# Patient Record
Sex: MALE | Race: WHITE | NOT HISPANIC OR LATINO | URBAN - METROPOLITAN AREA
[De-identification: names, ages, dates, MRNs, and addresses within clinical notes are randomized per-mention and may not be internally consistent; named-entity substitution may affect disease eponyms.]

---

## 2023-04-26 ENCOUNTER — OFFICE VISIT (OUTPATIENT)
Dept: CARDIOLOGY | Facility: CLINIC | Age: 43
End: 2023-04-26
Payer: COMMERCIAL

## 2023-04-26 VITALS
DIASTOLIC BLOOD PRESSURE: 78 MMHG | SYSTOLIC BLOOD PRESSURE: 120 MMHG | WEIGHT: 200.38 LBS | BODY MASS INDEX: 30.37 KG/M2 | HEIGHT: 68 IN | HEART RATE: 74 BPM

## 2023-04-26 DIAGNOSIS — M1A.9XX0 CHRONIC GOUT INVOLVING TOE OF RIGHT FOOT WITHOUT TOPHUS, UNSPECIFIED CAUSE: Chronic | ICD-10-CM

## 2023-04-26 DIAGNOSIS — R06.02 SHORTNESS OF BREATH: Primary | ICD-10-CM

## 2023-04-26 DIAGNOSIS — Z72.0 TOBACCO DIPPER: Chronic | ICD-10-CM

## 2023-04-26 DIAGNOSIS — I10 PRIMARY HYPERTENSION: Chronic | ICD-10-CM

## 2023-04-26 DIAGNOSIS — E78.00 HYPERCHOLESTEROLEMIA: Chronic | ICD-10-CM

## 2023-04-26 DIAGNOSIS — Z82.49 FAMILY HISTORY OF EARLY CAD: ICD-10-CM

## 2023-04-26 DIAGNOSIS — G47.39 OTHER SLEEP APNEA: Chronic | ICD-10-CM

## 2023-04-26 DIAGNOSIS — E66.9 OBESITY, CLASS I, BMI 30-34.9: Chronic | ICD-10-CM

## 2023-04-26 PROBLEM — E66.811 OBESITY, CLASS I, BMI 30-34.9: Status: ACTIVE | Noted: 2023-04-26

## 2023-04-26 PROCEDURE — 3078F PR MOST RECENT DIASTOLIC BLOOD PRESSURE < 80 MM HG: ICD-10-PCS | Mod: CPTII,S$GLB,, | Performed by: INTERNAL MEDICINE

## 2023-04-26 PROCEDURE — 99204 PR OFFICE/OUTPT VISIT, NEW, LEVL IV, 45-59 MIN: ICD-10-PCS | Mod: 25,S$GLB,, | Performed by: INTERNAL MEDICINE

## 2023-04-26 PROCEDURE — 93000 EKG 12-LEAD: ICD-10-PCS | Mod: S$GLB,,, | Performed by: INTERNAL MEDICINE

## 2023-04-26 PROCEDURE — 99204 OFFICE O/P NEW MOD 45 MIN: CPT | Mod: 25,S$GLB,, | Performed by: INTERNAL MEDICINE

## 2023-04-26 PROCEDURE — 1159F MED LIST DOCD IN RCRD: CPT | Mod: CPTII,S$GLB,, | Performed by: INTERNAL MEDICINE

## 2023-04-26 PROCEDURE — 4010F PR ACE/ARB THEARPY RXD/TAKEN: ICD-10-PCS | Mod: CPTII,S$GLB,, | Performed by: INTERNAL MEDICINE

## 2023-04-26 PROCEDURE — 93000 ELECTROCARDIOGRAM COMPLETE: CPT | Mod: S$GLB,,, | Performed by: INTERNAL MEDICINE

## 2023-04-26 PROCEDURE — 4010F ACE/ARB THERAPY RXD/TAKEN: CPT | Mod: CPTII,S$GLB,, | Performed by: INTERNAL MEDICINE

## 2023-04-26 PROCEDURE — 1159F PR MEDICATION LIST DOCUMENTED IN MEDICAL RECORD: ICD-10-PCS | Mod: CPTII,S$GLB,, | Performed by: INTERNAL MEDICINE

## 2023-04-26 PROCEDURE — 3074F SYST BP LT 130 MM HG: CPT | Mod: CPTII,S$GLB,, | Performed by: INTERNAL MEDICINE

## 2023-04-26 PROCEDURE — 3074F PR MOST RECENT SYSTOLIC BLOOD PRESSURE < 130 MM HG: ICD-10-PCS | Mod: CPTII,S$GLB,, | Performed by: INTERNAL MEDICINE

## 2023-04-26 PROCEDURE — 3078F DIAST BP <80 MM HG: CPT | Mod: CPTII,S$GLB,, | Performed by: INTERNAL MEDICINE

## 2023-04-26 PROCEDURE — 3008F BODY MASS INDEX DOCD: CPT | Mod: CPTII,S$GLB,, | Performed by: INTERNAL MEDICINE

## 2023-04-26 PROCEDURE — 3008F PR BODY MASS INDEX (BMI) DOCUMENTED: ICD-10-PCS | Mod: CPTII,S$GLB,, | Performed by: INTERNAL MEDICINE

## 2023-04-26 RX ORDER — AMLODIPINE BESYLATE 2.5 MG/1
2.5 TABLET ORAL
COMMUNITY
Start: 2023-04-03

## 2023-04-26 RX ORDER — PANTOPRAZOLE SODIUM 40 MG/1
40 TABLET, DELAYED RELEASE ORAL
COMMUNITY
Start: 2023-03-25

## 2023-04-26 RX ORDER — NAPROXEN 500 MG/1
500 TABLET ORAL 2 TIMES DAILY PRN
COMMUNITY
Start: 2023-03-31 | End: 2023-05-31

## 2023-04-26 RX ORDER — PROBENECID AND COLCHICINE .5; 5 MG/1; MG/1
1 TABLET ORAL NIGHTLY
COMMUNITY
Start: 2023-03-25

## 2023-04-26 RX ORDER — LISINOPRIL 40 MG/1
40 TABLET ORAL
COMMUNITY
Start: 2022-12-26

## 2023-04-26 RX ORDER — ATORVASTATIN CALCIUM 20 MG/1
20 TABLET, FILM COATED ORAL NIGHTLY
COMMUNITY
Start: 2023-03-25
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
## 2023-05-02 ENCOUNTER — LAB VISIT (OUTPATIENT)
Dept: PRIMARY CARE CLINIC | Facility: CLINIC | Age: 43
End: 2023-05-02
Payer: MEDICAID

## 2023-05-02 DIAGNOSIS — E66.9 OBESITY, CLASS I, BMI 30-34.9: Chronic | ICD-10-CM

## 2023-05-02 DIAGNOSIS — E78.00 HYPERCHOLESTEROLEMIA: Chronic | ICD-10-CM

## 2023-05-02 DIAGNOSIS — R06.02 SHORTNESS OF BREATH: ICD-10-CM

## 2023-05-02 DIAGNOSIS — I10 PRIMARY HYPERTENSION: Chronic | ICD-10-CM

## 2023-05-02 LAB
ALBUMIN SERPL BCP-MCNC: 4.4 G/DL (ref 3.5–5.2)
ALP SERPL-CCNC: 77 U/L (ref 55–135)
ALT SERPL W/O P-5'-P-CCNC: 58 U/L (ref 10–44)
ANION GAP SERPL CALC-SCNC: 12 MMOL/L (ref 8–16)
AST SERPL-CCNC: 32 U/L (ref 10–40)
BASOPHILS # BLD AUTO: 0.1 K/UL (ref 0–0.2)
BASOPHILS NFR BLD: 1.5 % (ref 0–1.9)
BILIRUB SERPL-MCNC: 0.8 MG/DL (ref 0.1–1)
BNP SERPL-MCNC: <10 PG/ML (ref 0–99)
BUN SERPL-MCNC: 20 MG/DL (ref 6–20)
CALCIUM SERPL-MCNC: 9.7 MG/DL (ref 8.7–10.5)
CHLORIDE SERPL-SCNC: 106 MMOL/L (ref 95–110)
CHOLEST SERPL-MCNC: 120 MG/DL (ref 120–199)
CHOLEST/HDLC SERPL: 4.1 {RATIO} (ref 2–5)
CO2 SERPL-SCNC: 24 MMOL/L (ref 23–29)
CREAT SERPL-MCNC: 1.5 MG/DL (ref 0.5–1.4)
DIFFERENTIAL METHOD: NORMAL
EOSINOPHIL # BLD AUTO: 0.2 K/UL (ref 0–0.5)
EOSINOPHIL NFR BLD: 2.2 % (ref 0–8)
ERYTHROCYTE [DISTWIDTH] IN BLOOD BY AUTOMATED COUNT: 12.7 % (ref 11.5–14.5)
EST. GFR  (NO RACE VARIABLE): 58.5 ML/MIN/1.73 M^2
GLUCOSE SERPL-MCNC: 92 MG/DL (ref 70–110)
HCT VFR BLD AUTO: 45.1 % (ref 40–54)
HDLC SERPL-MCNC: 29 MG/DL (ref 40–75)
HDLC SERPL: 24.2 % (ref 20–50)
HGB BLD-MCNC: 15.3 G/DL (ref 14–18)
IMM GRANULOCYTES # BLD AUTO: 0.01 K/UL (ref 0–0.04)
IMM GRANULOCYTES NFR BLD AUTO: 0.1 % (ref 0–0.5)
LDLC SERPL CALC-MCNC: 45.4 MG/DL (ref 63–159)
LYMPHOCYTES # BLD AUTO: 2.5 K/UL (ref 1–4.8)
LYMPHOCYTES NFR BLD: 36.5 % (ref 18–48)
MCH RBC QN AUTO: 30 PG (ref 27–31)
MCHC RBC AUTO-ENTMCNC: 33.9 G/DL (ref 32–36)
MCV RBC AUTO: 88 FL (ref 82–98)
MONOCYTES # BLD AUTO: 0.5 K/UL (ref 0.3–1)
MONOCYTES NFR BLD: 7.7 % (ref 4–15)
NEUTROPHILS # BLD AUTO: 3.6 K/UL (ref 1.8–7.7)
NEUTROPHILS NFR BLD: 52 % (ref 38–73)
NONHDLC SERPL-MCNC: 91 MG/DL
NRBC BLD-RTO: 0 /100 WBC
PLATELET # BLD AUTO: 235 K/UL (ref 150–450)
PMV BLD AUTO: 11 FL (ref 9.2–12.9)
POTASSIUM SERPL-SCNC: 4.4 MMOL/L (ref 3.5–5.1)
PROT SERPL-MCNC: 7.4 G/DL (ref 6–8.4)
RBC # BLD AUTO: 5.1 M/UL (ref 4.6–6.2)
SODIUM SERPL-SCNC: 142 MMOL/L (ref 136–145)
TRIGL SERPL-MCNC: 228 MG/DL (ref 30–150)
TSH SERPL DL<=0.005 MIU/L-ACNC: 2.41 UIU/ML (ref 0.4–4)
WBC # BLD AUTO: 6.84 K/UL (ref 3.9–12.7)

## 2023-05-02 PROCEDURE — 83880 ASSAY OF NATRIURETIC PEPTIDE: CPT | Performed by: INTERNAL MEDICINE

## 2023-05-02 PROCEDURE — 36415 PR COLLECTION VENOUS BLOOD,VENIPUNCTURE: ICD-10-PCS | Mod: ,,, | Performed by: STUDENT IN AN ORGANIZED HEALTH CARE EDUCATION/TRAINING PROGRAM

## 2023-05-02 PROCEDURE — 36415 PR COLLECTION VENOUS BLOOD,VENIPUNCTURE: ICD-10-PCS | Mod: S$GLB,,, | Performed by: INTERNAL MEDICINE

## 2023-05-02 PROCEDURE — 80061 LIPID PANEL: CPT | Performed by: INTERNAL MEDICINE

## 2023-05-02 PROCEDURE — 36415 COLL VENOUS BLD VENIPUNCTURE: CPT | Mod: ,,, | Performed by: STUDENT IN AN ORGANIZED HEALTH CARE EDUCATION/TRAINING PROGRAM

## 2023-05-02 PROCEDURE — 85025 COMPLETE CBC W/AUTO DIFF WBC: CPT | Performed by: INTERNAL MEDICINE

## 2023-05-02 PROCEDURE — 36415 COLL VENOUS BLD VENIPUNCTURE: CPT | Mod: S$GLB,,, | Performed by: INTERNAL MEDICINE

## 2023-05-02 PROCEDURE — 84443 ASSAY THYROID STIM HORMONE: CPT | Performed by: INTERNAL MEDICINE

## 2023-05-02 PROCEDURE — 80053 COMPREHEN METABOLIC PANEL: CPT | Performed by: INTERNAL MEDICINE

## 2023-05-24 ENCOUNTER — CLINICAL SUPPORT (OUTPATIENT)
Dept: CARDIOLOGY | Facility: HOSPITAL | Age: 43
End: 2023-05-24
Attending: INTERNAL MEDICINE
Payer: MEDICAID

## 2023-05-24 ENCOUNTER — HOSPITAL ENCOUNTER (OUTPATIENT)
Dept: RADIOLOGY | Facility: HOSPITAL | Age: 43
Discharge: HOME | End: 2023-05-24
Attending: INTERNAL MEDICINE
Payer: MEDICAID

## 2023-05-24 VITALS — HEIGHT: 68 IN | BODY MASS INDEX: 30.31 KG/M2 | WEIGHT: 200 LBS

## 2023-05-24 VITALS
SYSTOLIC BLOOD PRESSURE: 120 MMHG | BODY MASS INDEX: 30.31 KG/M2 | WEIGHT: 200 LBS | HEIGHT: 68 IN | DIASTOLIC BLOOD PRESSURE: 78 MMHG

## 2023-05-24 DIAGNOSIS — R06.02 SHORTNESS OF BREATH: ICD-10-CM

## 2023-05-24 DIAGNOSIS — Z72.0 TOBACCO DIPPER: ICD-10-CM

## 2023-05-24 DIAGNOSIS — Z82.49 FAMILY HISTORY OF EARLY CAD: Chronic | ICD-10-CM

## 2023-05-24 LAB
ASCENDING AORTA: 2.9 CM
AV INDEX (PROSTH): 0.7
AV MEAN GRADIENT: 5 MMHG
AV PEAK GRADIENT: 9 MMHG
AV VALVE AREA: 2.7 CM2
AV VELOCITY RATIO: 0.73
BSA FOR ECHO PROCEDURE: 2.09 M2
CV ECHO LV RWT: 0.34 CM
CV STRESS BASE HR: 65 BPM
DIASTOLIC BLOOD PRESSURE: 80 MMHG
DOP CALC AO PEAK VEL: 1.48 M/S
DOP CALC AO VTI: 30.8 CM
DOP CALC LVOT AREA: 3.9 CM2
DOP CALC LVOT DIAMETER: 2.22 CM
DOP CALC LVOT PEAK VEL: 1.08 M/S
DOP CALC LVOT STROKE VOLUME: 83.18 CM3
DOP CALCLVOT PEAK VEL VTI: 21.5 CM
E WAVE DECELERATION TIME: 177.48 MSEC
E/A RATIO: 1.16
E/E' RATIO: 6.85 M/S
ECHO LV POSTERIOR WALL: 0.81 CM (ref 0.6–1.1)
EJECTION FRACTION: 70 %
FRACTIONAL SHORTENING: 45 % (ref 28–44)
INTERVENTRICULAR SEPTUM: 0.87 CM (ref 0.6–1.1)
IVRT: 117.03 MSEC
LA MAJOR: 4.88 CM
LA MINOR: 5.21 CM
LA WIDTH: 3.7 CM
LEFT ABI: 1.17
LEFT ARM BP: 127 MMHG
LEFT ATRIUM SIZE: 4.22 CM
LEFT ATRIUM VOLUME INDEX: 32.8 ML/M2
LEFT ATRIUM VOLUME: 66.89 CM3
LEFT DORSALIS PEDIS: 130 MMHG
LEFT INTERNAL DIMENSION IN SYSTOLE: 2.57 CM (ref 2.1–4)
LEFT POSTERIOR TIBIAL: 148 MMHG
LEFT TBI: 0.88
LEFT TOE PRESSURE: 112 MMHG
LEFT VENTRICLE DIASTOLIC VOLUME INDEX: 50.2 ML/M2
LEFT VENTRICLE DIASTOLIC VOLUME: 102.41 ML
LEFT VENTRICLE MASS INDEX: 64 G/M2
LEFT VENTRICLE SYSTOLIC VOLUME INDEX: 11.8 ML/M2
LEFT VENTRICLE SYSTOLIC VOLUME: 23.99 ML
LEFT VENTRICULAR INTERNAL DIMENSION IN DIASTOLE: 4.7 CM (ref 3.5–6)
LEFT VENTRICULAR MASS: 130.28 G
LV LATERAL E/E' RATIO: 5.24 M/S
LV SEPTAL E/E' RATIO: 9.89 M/S
LVOT MG: 2.85 MMHG
LVOT MV: 0.8 CM/S
MV PEAK A VEL: 0.77 M/S
MV PEAK E VEL: 0.89 M/S
OHS CV CPX 1 MINUTE RECOVERY HEART RATE: 130 BPM
OHS CV CPX 85 PERCENT MAX PREDICTED HEART RATE MALE: 150
OHS CV CPX ESTIMATED METS: 13
OHS CV CPX MAX PREDICTED HEART RATE: 176
OHS CV CPX PATIENT IS FEMALE: 0
OHS CV CPX PATIENT IS MALE: 1
OHS CV CPX PEAK DIASTOLIC BLOOD PRESSURE: 71 MMHG
OHS CV CPX PEAK HEAR RATE: 151 BPM
OHS CV CPX PEAK RATE PRESSURE PRODUCT: NORMAL
OHS CV CPX PEAK SYSTOLIC BLOOD PRESSURE: 168 MMHG
OHS CV CPX PERCENT MAX PREDICTED HEART RATE ACHIEVED: 86
OHS CV CPX RATE PRESSURE PRODUCT PRESENTING: 7605
PISA MRMAX VEL: 4.85 M/S
PISA TR MAX VEL: 2.37 M/S
PULM VEIN S/D RATIO: 0.67
PV PEAK D VEL: 0.66 M/S
PV PEAK S VEL: 0.44 M/S
RA MAJOR: 4.49 CM
RA PRESSURE: 3 MMHG
RA WIDTH: 3.5 CM
RIGHT ABI: 1.24
RIGHT ARM BP: 117 MMHG
RIGHT DORSALIS PEDIS: 149 MMHG
RIGHT POSTERIOR TIBIAL: 157 MMHG
RIGHT TBI: 0.89
RIGHT TOE PRESSURE: 113 MMHG
RIGHT VENTRICULAR END-DIASTOLIC DIMENSION: 4.17 CM
RIGHT VENTRICULAR LENGTH IN DIASTOLE (APICAL 4-CHAMBER VIEW): 7.02 CM
RV MID DIAMA: 3.38 CM
RV TISSUE DOPPLER FREE WALL SYSTOLIC VELOCITY 1 (APICAL 4 CHAMBER VIEW): 0.02 CM/S
SINUS: 3.51 CM
STJ: 2.59 CM
STRESS ECHO POST EXERCISE DUR MIN: 6 MINUTES
STRESS ECHO POST EXERCISE DUR SEC: 54 SECONDS
SYSTOLIC BLOOD PRESSURE: 117 MMHG
TDI LATERAL: 0.17 M/S
TDI SEPTAL: 0.09 M/S
TDI: 0.13 M/S
TR MAX PG: 22 MMHG
TRICUSPID ANNULAR PLANE SYSTOLIC EXCURSION: 2.54 CM
TV REST PULMONARY ARTERY PRESSURE: 25 MMHG

## 2023-05-24 PROCEDURE — 93016 EXERCISE STRESS - EKG (CUPID ONLY): ICD-10-PCS | Mod: ,,, | Performed by: INTERNAL MEDICINE

## 2023-05-24 PROCEDURE — 71046 X-RAY EXAM CHEST 2 VIEWS: CPT | Mod: 26,,, | Performed by: RADIOLOGY

## 2023-05-24 PROCEDURE — 93018 EXERCISE STRESS - EKG (CUPID ONLY): ICD-10-PCS | Mod: ,,, | Performed by: INTERNAL MEDICINE

## 2023-05-24 PROCEDURE — 93306 TTE W/DOPPLER COMPLETE: CPT | Mod: PO

## 2023-05-24 PROCEDURE — 93922 UPR/L XTREMITY ART 2 LEVELS: CPT | Mod: 26,,, | Performed by: INTERNAL MEDICINE

## 2023-05-24 PROCEDURE — 71046 XR CHEST PA AND LATERAL: ICD-10-PCS | Mod: 26,,, | Performed by: RADIOLOGY

## 2023-05-24 PROCEDURE — 93017 CV STRESS TEST TRACING ONLY: CPT | Mod: PO

## 2023-05-24 PROCEDURE — 93922 ANKLE BRACHIAL INDICES (ABI): ICD-10-PCS | Mod: 26,,, | Performed by: INTERNAL MEDICINE

## 2023-05-24 PROCEDURE — 71046 X-RAY EXAM CHEST 2 VIEWS: CPT | Mod: TC,FY,PO

## 2023-05-24 PROCEDURE — 93018 CV STRESS TEST I&R ONLY: CPT | Mod: ,,, | Performed by: INTERNAL MEDICINE

## 2023-05-24 PROCEDURE — 93922 UPR/L XTREMITY ART 2 LEVELS: CPT | Mod: PO

## 2023-05-24 PROCEDURE — 93016 CV STRESS TEST SUPVJ ONLY: CPT | Mod: ,,, | Performed by: INTERNAL MEDICINE

## 2023-05-31 ENCOUNTER — OFFICE VISIT (OUTPATIENT)
Dept: CARDIOLOGY | Facility: CLINIC | Age: 43
End: 2023-05-31
Payer: MEDICAID

## 2023-05-31 VITALS
WEIGHT: 195.56 LBS | BODY MASS INDEX: 29.64 KG/M2 | DIASTOLIC BLOOD PRESSURE: 72 MMHG | HEART RATE: 79 BPM | HEIGHT: 68 IN | SYSTOLIC BLOOD PRESSURE: 106 MMHG

## 2023-05-31 DIAGNOSIS — E78.2 MIXED DYSLIPIDEMIA: Chronic | ICD-10-CM

## 2023-05-31 DIAGNOSIS — E66.3 OVERWEIGHT (BMI 25.0-29.9): Chronic | ICD-10-CM

## 2023-05-31 DIAGNOSIS — Z72.0 TOBACCO DIPPER: Chronic | ICD-10-CM

## 2023-05-31 DIAGNOSIS — I10 PRIMARY HYPERTENSION: Primary | Chronic | ICD-10-CM

## 2023-05-31 DIAGNOSIS — G47.39 OTHER SLEEP APNEA: Chronic | ICD-10-CM

## 2023-05-31 PROCEDURE — 4010F PR ACE/ARB THEARPY RXD/TAKEN: ICD-10-PCS | Mod: CPTII,S$GLB,, | Performed by: INTERNAL MEDICINE

## 2023-05-31 PROCEDURE — 3008F PR BODY MASS INDEX (BMI) DOCUMENTED: ICD-10-PCS | Mod: CPTII,S$GLB,, | Performed by: INTERNAL MEDICINE

## 2023-05-31 PROCEDURE — 99214 PR OFFICE/OUTPT VISIT, EST, LEVL IV, 30-39 MIN: ICD-10-PCS | Mod: S$GLB,,, | Performed by: INTERNAL MEDICINE

## 2023-05-31 PROCEDURE — 3008F BODY MASS INDEX DOCD: CPT | Mod: CPTII,S$GLB,, | Performed by: INTERNAL MEDICINE

## 2023-05-31 PROCEDURE — 3074F PR MOST RECENT SYSTOLIC BLOOD PRESSURE < 130 MM HG: ICD-10-PCS | Mod: CPTII,S$GLB,, | Performed by: INTERNAL MEDICINE

## 2023-05-31 PROCEDURE — 3074F SYST BP LT 130 MM HG: CPT | Mod: CPTII,S$GLB,, | Performed by: INTERNAL MEDICINE

## 2023-05-31 PROCEDURE — 1159F MED LIST DOCD IN RCRD: CPT | Mod: CPTII,S$GLB,, | Performed by: INTERNAL MEDICINE

## 2023-05-31 PROCEDURE — 3078F DIAST BP <80 MM HG: CPT | Mod: CPTII,S$GLB,, | Performed by: INTERNAL MEDICINE

## 2023-05-31 PROCEDURE — 1159F PR MEDICATION LIST DOCUMENTED IN MEDICAL RECORD: ICD-10-PCS | Mod: CPTII,S$GLB,, | Performed by: INTERNAL MEDICINE

## 2023-05-31 PROCEDURE — 4010F ACE/ARB THERAPY RXD/TAKEN: CPT | Mod: CPTII,S$GLB,, | Performed by: INTERNAL MEDICINE

## 2023-05-31 PROCEDURE — 3078F PR MOST RECENT DIASTOLIC BLOOD PRESSURE < 80 MM HG: ICD-10-PCS | Mod: CPTII,S$GLB,, | Performed by: INTERNAL MEDICINE

## 2023-05-31 PROCEDURE — 99214 OFFICE O/P EST MOD 30 MIN: CPT | Mod: S$GLB,,, | Performed by: INTERNAL MEDICINE
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
## 2024-12-04 ENCOUNTER — OCCUPATIONAL HEALTH (OUTPATIENT)
Dept: URGENT CARE | Facility: CLINIC | Age: 44
End: 2024-12-04

## 2024-12-04 DIAGNOSIS — Z00.00 ROUTINE GENERAL MEDICAL EXAMINATION AT A HEALTH CARE FACILITY: Primary | ICD-10-CM

## 2024-12-04 PROCEDURE — 92552 PURE TONE AUDIOMETRY AIR: CPT | Mod: S$GLB,,,

## 2024-12-04 PROCEDURE — 99172 OCULAR FUNCTION SCREEN: CPT | Mod: S$GLB,,,

## 2024-12-04 PROCEDURE — 99499 UNLISTED E&M SERVICE: CPT | Mod: S$GLB,,,

## 2024-12-04 PROCEDURE — 80305 DRUG TEST PRSMV DIR OPT OBS: CPT | Mod: S$GLB,,,
